# Patient Record
Sex: MALE
[De-identification: names, ages, dates, MRNs, and addresses within clinical notes are randomized per-mention and may not be internally consistent; named-entity substitution may affect disease eponyms.]

---

## 2019-11-15 ENCOUNTER — RX ONLY (RX ONLY)
Age: 29
End: 2019-11-15

## 2019-11-15 ENCOUNTER — ACNE/ROSACEA (OUTPATIENT)
Dept: URBAN - METROPOLITAN AREA CLINIC 34 | Facility: CLINIC | Age: 29
Setting detail: DERMATOLOGY
End: 2019-11-15

## 2019-11-15 DIAGNOSIS — L30.9 DERMATITIS, UNSPECIFIED: ICD-10-CM

## 2019-11-15 PROBLEM — L71.8 OTHER ROSACEA: Status: ACTIVE | Noted: 2019-11-15

## 2019-11-15 PROBLEM — L71.8 OTHER ROSACEA: Status: RESOLVED | Noted: 2019-11-15

## 2019-11-15 PROCEDURE — 99202 OFFICE O/P NEW SF 15 MIN: CPT

## 2021-05-10 ENCOUNTER — ACNE (OUTPATIENT)
Dept: URBAN - METROPOLITAN AREA CLINIC 36 | Facility: CLINIC | Age: 31
Setting detail: DERMATOLOGY
End: 2021-05-10

## 2021-05-10 ENCOUNTER — RX ONLY (RX ONLY)
Age: 31
End: 2021-05-10

## 2021-05-10 DIAGNOSIS — Z41.9 ENCOUNTER FOR PROCEDURE FOR PURPOSES OTHER THAN REMEDYING HEALTH STATE, UNSPECIFIED: ICD-10-CM

## 2021-05-10 PROCEDURE — 99213 OFFICE O/P EST LOW 20 MIN: CPT

## 2021-05-10 RX ORDER — DAPSONE 75 MG/G
1 APPLICATION GEL TOPICAL ONCE A DAY
Qty: 90 | Refills: 1
Start: 2021-05-10

## 2021-05-10 RX ORDER — DOXYCYCLINE 40 MG/1
1 CAPSULE CAPSULE ORAL ONCE A DAY
Qty: 30 | Refills: 6
Start: 2021-05-10

## 2022-08-31 ENCOUNTER — APPOINTMENT (OUTPATIENT)
Dept: URBAN - METROPOLITAN AREA CLINIC 207 | Age: 32
Setting detail: DERMATOLOGY
End: 2022-09-06

## 2022-08-31 DIAGNOSIS — L71.8 OTHER ROSACEA: ICD-10-CM

## 2022-08-31 PROCEDURE — OTHER MEDICATION COUNSELING: OTHER

## 2022-08-31 PROCEDURE — 99214 OFFICE O/P EST MOD 30 MIN: CPT | Mod: 95

## 2022-08-31 PROCEDURE — OTHER COUNSELING: OTHER

## 2022-08-31 PROCEDURE — OTHER PRESCRIPTION: OTHER

## 2022-08-31 PROCEDURE — OTHER PRESCRIPTION MEDICATION MANAGEMENT: OTHER

## 2022-08-31 RX ORDER — DOXYCYCLINE 40 MG/1
CAPSULE ORAL QD
Qty: 30 | Refills: 11 | Status: ERX | COMMUNITY
Start: 2022-08-31

## 2022-08-31 RX ORDER — BENZOYL PEROXIDE 50 MG/G
CREAM TOPICAL
Qty: 30 | Refills: 6 | Status: ERX | COMMUNITY
Start: 2022-08-31

## 2022-08-31 ASSESSMENT — LOCATION ZONE DERM: LOCATION ZONE: FACE

## 2022-08-31 ASSESSMENT — LOCATION SIMPLE DESCRIPTION DERM: LOCATION SIMPLE: RIGHT FOREHEAD

## 2022-08-31 ASSESSMENT — LOCATION DETAILED DESCRIPTION DERM: LOCATION DETAILED: RIGHT MEDIAL FOREHEAD

## 2022-08-31 NOTE — PROCEDURE: MEDICATION COUNSELING
CBC, CMP, MM labs in 1 year Topical Clindamycin Pregnancy And Lactation Text: This medication is Pregnancy Category B and is considered safe during pregnancy. It is unknown if it is excreted in breast milk.

## 2022-08-31 NOTE — PROCEDURE: MEDICATION COUNSELING
90 Aklief Pregnancy And Lactation Text: It is unknown if this medication is safe to use during pregnancy.  It is unknown if this medication is excreted in breast milk.  Breastfeeding women should use the topical cream on the smallest area of the skin for the shortest time needed while breastfeeding.  Do not apply to nipple and areola.

## 2022-08-31 NOTE — PROCEDURE: MEDICATION COUNSELING
Xeleffiez Pregnancy And Lactation Text: This medication is Pregnancy Category D and is not considered safe during pregnancy.  The risk during breast feeding is also uncertain.

## 2022-08-31 NOTE — PROCEDURE: MEDICATION COUNSELING
Spironolactone Counseling: Patient advised regarding risks of diarrhea, abdominal pain, hyperkalemia, birth defects (for female patients), liver toxicity and renal toxicity. The patient may need blood work to monitor liver and kidney function and potassium levels while on therapy. The patient verbalized understanding of the proper use and possible adverse effects of spironolactone.  All of the patient's questions and concerns were addressed. No bruits; no thyromegaly or nodules

## 2022-08-31 NOTE — HPI: RASH (ROSACEA)
Is This A New Presentation, Or A Follow-Up?: Follow Up Rosacea
Additional History: Patient needs refills.

## 2022-08-31 NOTE — PROCEDURE: MEDICATION COUNSELING
normal... Isotretinoin Pregnancy And Lactation Text: This medication is Pregnancy Category X and is considered extremely dangerous during pregnancy. It is unknown if it is excreted in breast milk.

## 2025-06-30 NOTE — PROCEDURE: PRESCRIPTION MEDICATION MANAGEMENT
Denies/no
Detail Level: Zone
Initiate Treatment: Rx: Epsolay cream - apply thin layer on face nightly as tolerable\\n\\nAdvised topical contains BPO which can bleach fabrics\\nSunscreen SPF 30+ daily
Continue Regimen: Refills sent/good for 1 year\\n\\nRx refill: Oracea 40mg - take one pill by mouth once a day. Stay upright for 30 mins after taking
Render In Strict Bullet Format?: No